# Patient Record
(demographics unavailable — no encounter records)

---

## 2025-07-21 NOTE — ASSESSMENT
[FreeTextEntry1] : I discussed the recommended treatment with the patient, including its potential risks and benefits. I explained the expected outcomes, possible side effects, and alternative options to ensure an informed decision. The patient was encouraged to ask questions, and I addressed all concerns to ensure they understood and consented to proceed.

## 2025-07-21 NOTE — REVIEW OF SYSTEMS
[Feeling Poorly] : not feeling poorly [Eyesight Problems] : no eyesight problems [Cough] : no cough [Constipation] : no constipation [Hesitancy] : no urinary hesitancy [Nocturia] : no nocturia [Anxiety] : no anxiety [Muscle Weakness] : no muscle weakness

## 2025-07-21 NOTE — HISTORY OF PRESENT ILLNESS
[FreeTextEntry1] : This is a telehealth visit.  Was done on telephone only.  This was due for technical reasons.  Unable to get the video connection going.  Patient was accompanied by his wife.  He consented to have this done by telehealth.   2 kids IUD.   for 5 years.  Office [Urinary Incontinence] : no urinary incontinence [Urinary Retention] : no urinary retention [Urinary Urgency] : no urinary urgency [Urinary Frequency] : no urinary frequency [Nocturia] : no nocturia [Straining] : no straining [Weak Stream] : no weak stream